# Patient Record
Sex: MALE | Race: ASIAN | NOT HISPANIC OR LATINO | Employment: FULL TIME | URBAN - METROPOLITAN AREA
[De-identification: names, ages, dates, MRNs, and addresses within clinical notes are randomized per-mention and may not be internally consistent; named-entity substitution may affect disease eponyms.]

---

## 2021-04-19 ENCOUNTER — IMMUNIZATION (OUTPATIENT)
Dept: NURSING | Facility: CLINIC | Age: 26
End: 2021-04-19
Payer: COMMERCIAL

## 2021-04-19 PROCEDURE — 91300 PR COVID VAC PFIZER DIL RECON 30 MCG/0.3 ML IM: CPT

## 2021-04-19 PROCEDURE — 0001A PR COVID VAC PFIZER DIL RECON 30 MCG/0.3 ML IM: CPT

## 2021-05-02 ENCOUNTER — HEALTH MAINTENANCE LETTER (OUTPATIENT)
Age: 26
End: 2021-05-02

## 2021-05-10 ENCOUNTER — IMMUNIZATION (OUTPATIENT)
Dept: NURSING | Facility: CLINIC | Age: 26
End: 2021-05-10
Attending: FAMILY MEDICINE
Payer: COMMERCIAL

## 2021-05-10 PROCEDURE — 0002A PR COVID VAC PFIZER DIL RECON 30 MCG/0.3 ML IM: CPT

## 2021-05-10 PROCEDURE — 91300 PR COVID VAC PFIZER DIL RECON 30 MCG/0.3 ML IM: CPT

## 2021-10-17 ENCOUNTER — HEALTH MAINTENANCE LETTER (OUTPATIENT)
Age: 26
End: 2021-10-17

## 2022-05-29 ENCOUNTER — HEALTH MAINTENANCE LETTER (OUTPATIENT)
Age: 27
End: 2022-05-29

## 2022-10-03 ENCOUNTER — HEALTH MAINTENANCE LETTER (OUTPATIENT)
Age: 27
End: 2022-10-03

## 2023-05-29 ENCOUNTER — HOSPITAL ENCOUNTER (EMERGENCY)
Facility: HOSPITAL | Age: 28
Discharge: HOME | End: 2023-05-29
Admitting: PHYSICIAN ASSISTANT
Payer: COMMERCIAL

## 2023-05-29 VITALS
SYSTOLIC BLOOD PRESSURE: 135 MMHG | TEMPERATURE: 97.4 F | HEART RATE: 61 BPM | DIASTOLIC BLOOD PRESSURE: 78 MMHG | HEIGHT: 65 IN | BODY MASS INDEX: 30.82 KG/M2 | OXYGEN SATURATION: 97 % | RESPIRATION RATE: 16 BRPM | WEIGHT: 185 LBS

## 2023-05-29 DIAGNOSIS — S91.119A TOE LACERATION: ICD-10-CM

## 2023-05-29 PROCEDURE — 90471 IMMUNIZATION ADMIN: CPT | Performed by: PHYSICIAN ASSISTANT

## 2023-05-29 PROCEDURE — 12002 RPR S/N/AX/GEN/TRNK2.6-7.5CM: CPT

## 2023-05-29 PROCEDURE — 99283 EMERGENCY DEPT VISIT LOW MDM: CPT | Mod: 25

## 2023-05-29 PROCEDURE — 90715 TDAP VACCINE 7 YRS/> IM: CPT | Performed by: PHYSICIAN ASSISTANT

## 2023-05-29 PROCEDURE — 250N000011 HC RX IP 250 OP 636: Performed by: PHYSICIAN ASSISTANT

## 2023-05-29 RX ADMIN — CLOSTRIDIUM TETANI TOXOID ANTIGEN (FORMALDEHYDE INACTIVATED), CORYNEBACTERIUM DIPHTHERIAE TOXOID ANTIGEN (FORMALDEHYDE INACTIVATED), BORDETELLA PERTUSSIS TOXOID ANTIGEN (GLUTARALDEHYDE INACTIVATED), BORDETELLA PERTUSSIS FILAMENTOUS HEMAGGLUTININ ANTIGEN (FORMALDEHYDE INACTIVATED), BORDETELLA PERTUSSIS PERTACTIN ANTIGEN, AND BORDETELLA PERTUSSIS FIMBRIAE 2/3 ANTIGEN 0.5 ML: 5; 2; 2.5; 5; 3; 5 INJECTION, SUSPENSION INTRAMUSCULAR at 12:29

## 2023-05-29 NOTE — ED NOTES
He had a vasovagal episode while the provider was numbing his toe at the time. She layed him supine in the chair. He never lost consciousness.

## 2023-05-29 NOTE — ED TRIAGE NOTES
Accidentally dropped knife on greater toe left foot. Is not utd on tetanus. Bleeding controlled.     Triage Assessment     Row Name 05/29/23 1144       Triage Assessment (Adult)    Airway WDL WDL

## 2023-05-29 NOTE — ED PROVIDER NOTES
ED PROVIDER NOTE    EMERGENCY DEPARTMENT ENCOUNTER      NAME: Vera Galindo  AGE: 28 year old male  YOB: 1995  MRN: [id number]  EVALUATION DATE & TIME: 5/29/2023 11:45 AM    PCP: No primary care provider on file.    ED PROVIDER: Morelia Castillo PA-C      Chief Complaint   Patient presents with     Laceration       FINAL IMPRESSION:  1. Toe laceration          MEDICAL DECISION MAKING:    Pertinent Labs & Imaging studies reviewed. (See chart for details)    28 year old male who is otherwise healthy presenting with a left great toe laceration.  He was getting ready to go fishing when a utility knife fell off the table and punctured the dorsal aspect of his left great toe.    He denies any numbness or weakness.  Unsure of his last tetanus.    Vital stable.  He is neurovascularly intact.  The wound was thoroughly evaluated and there is no signs of tendon injury or signs of bony injury.  I do not think x-rays are necessary at this time.    Tetanus was updated.  His wound was thoroughly cleaned and repaired.  We discussed wound precautions, return precautions and follow-up.    At the conclusion of the encounter I discussed the results of all of the tests and the disposition. The questions were answered. The patient or family acknowledged understanding and was agreeable with the care plan.       Medical Decision Making    History:    Supplemental history from: Family Member/Significant Other    External Record(s) reviewed: Other: [facility] Records    Work Up:    Chart documentation includes differential considered and any EKGs or imaging independently interpreted by provider, where specified.    In additional to work up documented, I considered the following work up: Documented in chart, if applicable.  Considered x-rays but no findings to suggest bony injury on exam.    External consultation:    Discussion of management with another provider: Documented in chart, if applicable    Complicating factors:    Care  impacted by chronic illness: N/A    Care affected by social determinants of health: Access to Medical Care    Disposition considerations: Discharge. No recommendations on prescription strength medication(s). See documentation for any additional details.       ED COURSE  12:12 PM  Met and evaluated patient. Discussed ED plan.   12:18 PM Rechecked the patient to perform laceration repair.   12:45 PM Updated patient on diagnostic findings. We also discussed the plan for discharge. They are agreeable and comfortable with plan.    MEDICATIONS GIVEN IN THE EMERGENCY:  Medications   Tdap (tetanus-diphtheria-acell pertussis) (ADACEL) injection 0.5 mL (0.5 mLs Intramuscular $Given 5/29/23 6849)       NEW PRESCRIPTIONS STARTED AT TODAY'S ER VISIT  There are no discharge medications for this patient.      =================================================================    HPI    Patient information was obtained from: Patient, Significant Other      Vera Galindo is a 28 year old male, otherwise healthy, who presents to the ED by private car for evaluation of a left great toe laceration.    Per [facility] records, patient does not have a Tdap on file.     Patient reports that he was getting ready to go fishing earlier today when he knocked over an open pocket knife that was sitting on a table adjacent to him. He states that the knife landed onto his left great toe and endorses pain secondary to the accident. No numbness. No difficulty moving the toe.    Of note, patient denies any known history of bleeding disorders or diabetes.     There are otherwise no other medical complaints expressed at this time.     REVIEW OF SYSTEMS   See HPI  PAST MEDICAL HISTORY:  No past medical history on file.    PAST SURGICAL HISTORY:  History reviewed. No pertinent surgical history.    CURRENT MEDICATIONS:    No current facility-administered medications for this encounter.  No current outpatient medications on file.    ALLERGIES:  No Known  "Allergies    FAMILY HISTORY:  No family history on file.      VITALS:  Vitals:    05/29/23 1144 05/29/23 1230 05/29/23 1245   BP: 135/78     Pulse: 66 60 61   Resp: 16     Temp: 97.4  F (36.3  C)     TempSrc: Oral     SpO2: 98% 96% 97%   Weight: 83.9 kg (185 lb)     Height: 1.651 m (5' 5\")         PHYSICAL EXAM    General: Alert, orientated, no acute distress.  Appears stated age.  Head: Normocephalic, no signs of trauma.    Neck: Supple  Musculoskeletal: There is an obvious horizontal laceration over the dorsum of the left great toe, measuring approximately 3 cm.  He has good sensation to light touch distally and good two-point discrimination.  Normal cap refill.  Normal movement of the toe versus resistance.  Laceration cleaned thoroughly evaluated and no signs of foreign body or tendon involvement.  Skin: Normal color, no rashes. 3 cm laceration to left big toe  Neuro: Alert and orientated appropriately.  Normal sensation and strength.  No focal deficits.  Pysch: Normal mood and affect.      LAB:  Labs Ordered and Resulted from Time of ED Arrival to Time of ED Departure - No data to display    RADIOLOGY:  No orders to display       PROCEDURES:   PROCEDURE: Laceration Repair   INDICATIONS: Laceration   PROCEDURE PROVIDER: Morelia Castillo PA-C   SITE: Left big toe   TYPE/SIZE: simple, clean and no foreign body visualized  3 cm (total length)   FUNCTIONAL ASSESSMENT: Distal sensation, circulation and motor intact   MEDICATION: 1 mLs of 2% Lidocaine with epinephrine   PREPARATION: irrigation with Normal saline   DEBRIDEMENT: no debridement   CLOSURE:  Superficial layer closed with 5 stitches of 5-0 Ethilon simple interrupted    Total number of sutures/staples placed: 5     I, Ron Hale, am serving as a scribe to document services personally performed by Morelia Castillo PA-C based on my observation and the provider's statements to me. I, Morelia Castillo PA-C attest that Ron Hale is acting in a scribe capacity, has " observed my performance of the services and has documented them in accordance with my direction.    Morelia Castillo PA-C   Emergency Medicine       Morelia Castillo PA-C  05/29/23 8193

## 2023-05-29 NOTE — DISCHARGE INSTRUCTIONS
We have cleaned and repaired your laceration with 5 sutures.  Avoid getting the wound wet for the first 24 hours.  Then avoid soaking the wound or putting it in lake or stream water.    Keep it covered when you are out and about.  Monitor for any signs of infection.  If you notice any redness, swelling, increased pain, you should be seen right away otherwise she should follow-up in 7 to 10 days for suture removal.

## 2023-06-04 ENCOUNTER — HEALTH MAINTENANCE LETTER (OUTPATIENT)
Age: 28
End: 2023-06-04

## 2024-07-27 ENCOUNTER — HEALTH MAINTENANCE LETTER (OUTPATIENT)
Age: 29
End: 2024-07-27

## 2025-08-10 ENCOUNTER — HEALTH MAINTENANCE LETTER (OUTPATIENT)
Age: 30
End: 2025-08-10